# Patient Record
Sex: MALE | Race: WHITE | NOT HISPANIC OR LATINO | Employment: OTHER | URBAN - METROPOLITAN AREA
[De-identification: names, ages, dates, MRNs, and addresses within clinical notes are randomized per-mention and may not be internally consistent; named-entity substitution may affect disease eponyms.]

---

## 2019-02-04 ENCOUNTER — APPOINTMENT (OUTPATIENT)
Dept: CT IMAGING | Facility: CLINIC | Age: 69
End: 2019-02-04
Payer: MEDICARE

## 2019-02-04 ENCOUNTER — HOSPITAL ENCOUNTER (OUTPATIENT)
Facility: CLINIC | Age: 69
Setting detail: OBSERVATION
Discharge: HOME | End: 2019-02-04
Attending: INTERNAL MEDICINE | Admitting: INTERNAL MEDICINE
Payer: MEDICARE

## 2019-02-04 ENCOUNTER — APPOINTMENT (OUTPATIENT)
Dept: CT IMAGING | Facility: CLINIC | Age: 69
End: 2019-02-04
Attending: INTERNAL MEDICINE
Payer: MEDICARE

## 2019-02-04 ENCOUNTER — HOSPITAL ENCOUNTER (EMERGENCY)
Facility: CLINIC | Age: 69
Discharge: OTHER INSTITUTION | End: 2019-02-04
Attending: EMERGENCY MEDICINE | Admitting: EMERGENCY MEDICINE
Payer: MEDICARE

## 2019-02-04 VITALS
RESPIRATION RATE: 11 BRPM | DIASTOLIC BLOOD PRESSURE: 104 MMHG | TEMPERATURE: 99.5 F | SYSTOLIC BLOOD PRESSURE: 167 MMHG | OXYGEN SATURATION: 95 % | HEART RATE: 84 BPM

## 2019-02-04 VITALS
SYSTOLIC BLOOD PRESSURE: 148 MMHG | OXYGEN SATURATION: 97 % | DIASTOLIC BLOOD PRESSURE: 86 MMHG | TEMPERATURE: 98.2 F | RESPIRATION RATE: 16 BRPM

## 2019-02-04 DIAGNOSIS — S06.5XAA SUBDURAL HEMATOMA (H): ICD-10-CM

## 2019-02-04 DIAGNOSIS — S06.5XAA SUBDURAL HEMATOMA (H): Primary | ICD-10-CM

## 2019-02-04 LAB
ANION GAP SERPL CALCULATED.3IONS-SCNC: 8 MMOL/L (ref 3–14)
BASOPHILS # BLD AUTO: 0.1 10E9/L (ref 0–0.2)
BASOPHILS NFR BLD AUTO: 0.6 %
BUN SERPL-MCNC: 37 MG/DL (ref 7–30)
CALCIUM SERPL-MCNC: 7.9 MG/DL (ref 8.5–10.1)
CHLORIDE SERPL-SCNC: 109 MMOL/L (ref 94–109)
CO2 SERPL-SCNC: 23 MMOL/L (ref 20–32)
CREAT SERPL-MCNC: 2.39 MG/DL (ref 0.66–1.25)
DIFFERENTIAL METHOD BLD: ABNORMAL
EOSINOPHIL # BLD AUTO: 0.3 10E9/L (ref 0–0.7)
EOSINOPHIL NFR BLD AUTO: 3.1 %
ERYTHROCYTE [DISTWIDTH] IN BLOOD BY AUTOMATED COUNT: 12.5 % (ref 10–15)
GFR SERPL CREATININE-BSD FRML MDRD: 27 ML/MIN/{1.73_M2}
GLUCOSE SERPL-MCNC: 88 MG/DL (ref 70–99)
HCT VFR BLD AUTO: 41 % (ref 40–53)
HGB BLD-MCNC: 13.2 G/DL (ref 13.3–17.7)
IMM GRANULOCYTES # BLD: 0.1 10E9/L (ref 0–0.4)
IMM GRANULOCYTES NFR BLD: 0.6 %
INR PPP: 1.18 (ref 0.86–1.14)
LYMPHOCYTES # BLD AUTO: 1.6 10E9/L (ref 0.8–5.3)
LYMPHOCYTES NFR BLD AUTO: 18.1 %
MCH RBC QN AUTO: 30.8 PG (ref 26.5–33)
MCHC RBC AUTO-ENTMCNC: 32.2 G/DL (ref 31.5–36.5)
MCV RBC AUTO: 96 FL (ref 78–100)
MONOCYTES # BLD AUTO: 0.8 10E9/L (ref 0–1.3)
MONOCYTES NFR BLD AUTO: 8.6 %
NEUTROPHILS # BLD AUTO: 6 10E9/L (ref 1.6–8.3)
NEUTROPHILS NFR BLD AUTO: 69 %
NRBC # BLD AUTO: 0 10*3/UL
NRBC BLD AUTO-RTO: 0 /100
PLATELET # BLD AUTO: 249 10E9/L (ref 150–450)
POTASSIUM SERPL-SCNC: 4.4 MMOL/L (ref 3.4–5.3)
RBC # BLD AUTO: 4.28 10E12/L (ref 4.4–5.9)
SODIUM SERPL-SCNC: 140 MMOL/L (ref 133–144)
WBC # BLD AUTO: 8.7 10E9/L (ref 4–11)

## 2019-02-04 PROCEDURE — 70450 CT HEAD/BRAIN W/O DYE: CPT

## 2019-02-04 PROCEDURE — G0378 HOSPITAL OBSERVATION PER HR: HCPCS

## 2019-02-04 PROCEDURE — 85610 PROTHROMBIN TIME: CPT | Performed by: EMERGENCY MEDICINE

## 2019-02-04 PROCEDURE — 25000132 ZZH RX MED GY IP 250 OP 250 PS 637: Mod: GY | Performed by: INTERNAL MEDICINE

## 2019-02-04 PROCEDURE — 99207 ZZC CDG-HISTORY COMP: MEETS EXP. PROBLEM FOCUSED-DOWN CODED LACK OF ROS: CPT | Performed by: INTERNAL MEDICINE

## 2019-02-04 PROCEDURE — 99285 EMERGENCY DEPT VISIT HI MDM: CPT | Mod: 25

## 2019-02-04 PROCEDURE — 25000128 H RX IP 250 OP 636

## 2019-02-04 PROCEDURE — 25000128 H RX IP 250 OP 636: Performed by: INTERNAL MEDICINE

## 2019-02-04 PROCEDURE — 80048 BASIC METABOLIC PNL TOTAL CA: CPT | Performed by: EMERGENCY MEDICINE

## 2019-02-04 PROCEDURE — 99221 1ST HOSP IP/OBS SF/LOW 40: CPT | Performed by: PHYSICIAN ASSISTANT

## 2019-02-04 PROCEDURE — 96374 THER/PROPH/DIAG INJ IV PUSH: CPT

## 2019-02-04 PROCEDURE — 99218 ZZC INITIAL OBSERVATION CARE,LEVL I: CPT | Performed by: INTERNAL MEDICINE

## 2019-02-04 PROCEDURE — 99207 ZZC CDG-CODE CATEGORY CHANGED: CPT | Performed by: INTERNAL MEDICINE

## 2019-02-04 PROCEDURE — 70450 CT HEAD/BRAIN W/O DYE: CPT | Mod: 77

## 2019-02-04 PROCEDURE — A9270 NON-COVERED ITEM OR SERVICE: HCPCS | Mod: GY | Performed by: INTERNAL MEDICINE

## 2019-02-04 PROCEDURE — 96376 TX/PRO/DX INJ SAME DRUG ADON: CPT

## 2019-02-04 PROCEDURE — 85025 COMPLETE CBC W/AUTO DIFF WBC: CPT | Performed by: EMERGENCY MEDICINE

## 2019-02-04 PROCEDURE — 25000128 H RX IP 250 OP 636: Performed by: EMERGENCY MEDICINE

## 2019-02-04 RX ORDER — ATORVASTATIN CALCIUM 40 MG/1
40 TABLET, FILM COATED ORAL DAILY
COMMUNITY

## 2019-02-04 RX ORDER — AMOXICILLIN 250 MG
2 CAPSULE ORAL 2 TIMES DAILY
Status: DISCONTINUED | OUTPATIENT
Start: 2019-02-04 | End: 2019-02-04 | Stop reason: HOSPADM

## 2019-02-04 RX ORDER — POLYETHYLENE GLYCOL 3350 17 G/17G
17 POWDER, FOR SOLUTION ORAL DAILY PRN
Status: DISCONTINUED | OUTPATIENT
Start: 2019-02-04 | End: 2019-02-04 | Stop reason: HOSPADM

## 2019-02-04 RX ORDER — METOPROLOL TARTRATE 50 MG
50 TABLET ORAL DAILY
COMMUNITY

## 2019-02-04 RX ORDER — ATORVASTATIN CALCIUM 40 MG/1
40 TABLET, FILM COATED ORAL EVERY EVENING
Status: DISCONTINUED | OUTPATIENT
Start: 2019-02-04 | End: 2019-02-04 | Stop reason: HOSPADM

## 2019-02-04 RX ORDER — DOXAZOSIN 2 MG/1
2 TABLET ORAL AT BEDTIME
Status: DISCONTINUED | OUTPATIENT
Start: 2019-02-04 | End: 2019-02-04 | Stop reason: HOSPADM

## 2019-02-04 RX ORDER — AMOXICILLIN 250 MG
1 CAPSULE ORAL 2 TIMES DAILY PRN
Status: DISCONTINUED | OUTPATIENT
Start: 2019-02-04 | End: 2019-02-04 | Stop reason: HOSPADM

## 2019-02-04 RX ORDER — DOXAZOSIN 2 MG/1
2 TABLET ORAL AT BEDTIME
COMMUNITY

## 2019-02-04 RX ORDER — LABETALOL HYDROCHLORIDE 5 MG/ML
INJECTION, SOLUTION INTRAVENOUS
Status: COMPLETED
Start: 2019-02-04 | End: 2019-02-04

## 2019-02-04 RX ORDER — ONDANSETRON 2 MG/ML
4 INJECTION INTRAMUSCULAR; INTRAVENOUS EVERY 6 HOURS PRN
Status: DISCONTINUED | OUTPATIENT
Start: 2019-02-04 | End: 2019-02-04 | Stop reason: HOSPADM

## 2019-02-04 RX ORDER — PANTOPRAZOLE SODIUM 40 MG/1
40 TABLET, DELAYED RELEASE ORAL
Status: DISCONTINUED | OUTPATIENT
Start: 2019-02-04 | End: 2019-02-04 | Stop reason: HOSPADM

## 2019-02-04 RX ORDER — LIDOCAINE 40 MG/G
CREAM TOPICAL
Status: DISCONTINUED | OUTPATIENT
Start: 2019-02-04 | End: 2019-02-04 | Stop reason: HOSPADM

## 2019-02-04 RX ORDER — AMOXICILLIN 250 MG
2 CAPSULE ORAL 2 TIMES DAILY PRN
Status: DISCONTINUED | OUTPATIENT
Start: 2019-02-04 | End: 2019-02-04 | Stop reason: HOSPADM

## 2019-02-04 RX ORDER — ACETAMINOPHEN 500 MG
500-1000 TABLET ORAL EVERY 6 HOURS PRN
Status: DISCONTINUED | OUTPATIENT
Start: 2019-02-04 | End: 2019-02-04 | Stop reason: HOSPADM

## 2019-02-04 RX ORDER — SODIUM CHLORIDE 9 MG/ML
INJECTION, SOLUTION INTRAVENOUS CONTINUOUS
Status: DISCONTINUED | OUTPATIENT
Start: 2019-02-04 | End: 2019-02-04 | Stop reason: HOSPADM

## 2019-02-04 RX ORDER — LABETALOL HYDROCHLORIDE 5 MG/ML
20 INJECTION, SOLUTION INTRAVENOUS ONCE
Status: COMPLETED | OUTPATIENT
Start: 2019-02-04 | End: 2019-02-04

## 2019-02-04 RX ORDER — BISACODYL 10 MG
10 SUPPOSITORY, RECTAL RECTAL DAILY PRN
Status: DISCONTINUED | OUTPATIENT
Start: 2019-02-04 | End: 2019-02-04 | Stop reason: HOSPADM

## 2019-02-04 RX ORDER — METOPROLOL TARTRATE 50 MG
50 TABLET ORAL EVERY EVENING
Status: DISCONTINUED | OUTPATIENT
Start: 2019-02-04 | End: 2019-02-04 | Stop reason: HOSPADM

## 2019-02-04 RX ORDER — LABETALOL HYDROCHLORIDE 5 MG/ML
10 INJECTION, SOLUTION INTRAVENOUS
Status: DISCONTINUED | OUTPATIENT
Start: 2019-02-04 | End: 2019-02-04 | Stop reason: HOSPADM

## 2019-02-04 RX ORDER — ONDANSETRON 4 MG/1
4 TABLET, ORALLY DISINTEGRATING ORAL EVERY 6 HOURS PRN
Status: DISCONTINUED | OUTPATIENT
Start: 2019-02-04 | End: 2019-02-04 | Stop reason: HOSPADM

## 2019-02-04 RX ORDER — ACETAMINOPHEN 500 MG
500-1000 TABLET ORAL EVERY 6 HOURS PRN
COMMUNITY

## 2019-02-04 RX ORDER — ACETAMINOPHEN 325 MG/1
650 TABLET ORAL EVERY 4 HOURS PRN
Status: DISCONTINUED | OUTPATIENT
Start: 2019-02-04 | End: 2019-02-04

## 2019-02-04 RX ORDER — NALOXONE HYDROCHLORIDE 0.4 MG/ML
.1-.4 INJECTION, SOLUTION INTRAMUSCULAR; INTRAVENOUS; SUBCUTANEOUS
Status: DISCONTINUED | OUTPATIENT
Start: 2019-02-04 | End: 2019-02-04 | Stop reason: HOSPADM

## 2019-02-04 RX ORDER — ASPIRIN 81 MG/1
81 TABLET ORAL DAILY
Status: ON HOLD | COMMUNITY
End: 2019-02-04

## 2019-02-04 RX ORDER — AMOXICILLIN 250 MG
1 CAPSULE ORAL 2 TIMES DAILY
Status: DISCONTINUED | OUTPATIENT
Start: 2019-02-04 | End: 2019-02-04 | Stop reason: HOSPADM

## 2019-02-04 RX ADMIN — LABETALOL HYDROCHLORIDE 20 MG: 5 INJECTION INTRAVENOUS at 03:01

## 2019-02-04 RX ADMIN — ACETAMINOPHEN 650 MG: 325 TABLET, FILM COATED ORAL at 09:31

## 2019-02-04 RX ADMIN — PANTOPRAZOLE SODIUM 40 MG: 40 TABLET, DELAYED RELEASE ORAL at 08:04

## 2019-02-04 RX ADMIN — LABETALOL HYDROCHLORIDE 20 MG: 5 INJECTION INTRAVENOUS at 04:12

## 2019-02-04 RX ADMIN — SODIUM CHLORIDE, PRESERVATIVE FREE: 5 INJECTION INTRAVENOUS at 08:04

## 2019-02-04 RX ADMIN — LABETALOL HYDROCHLORIDE 20 MG: 5 INJECTION, SOLUTION INTRAVENOUS at 04:12

## 2019-02-04 SDOH — HEALTH STABILITY: MENTAL HEALTH: HOW OFTEN DO YOU HAVE A DRINK CONTAINING ALCOHOL?: NEVER

## 2019-02-04 ASSESSMENT — ACTIVITIES OF DAILY LIVING (ADL)
ADLS_ACUITY_SCORE: 11
ADLS_ACUITY_SCORE: 11

## 2019-02-04 ASSESSMENT — ENCOUNTER SYMPTOMS
NECK PAIN: 0
WOUND: 1
HEADACHES: 1
BACK PAIN: 0
VOMITING: 0
DIZZINESS: 1
NAUSEA: 0
NUMBNESS: 0
WEAKNESS: 0

## 2019-02-04 NOTE — DISCHARGE SUMMARY
[facility]  Discharge Summary        Bossman Wayne MRN# [id number]   YOB: 1950 Age: 68 year old     Date of Admission:  2/4/2019  Date of Discharge:  2/4/2019  Admitting Physician:  Edel Orellana MD  Discharge Physician: Edel Orellana MD  Discharging Service: Hospitalist     Primary Provider: [facility]  Primary Care Physician Phone Number: None         Discharge Diagnoses/Problem Oriented Hospital Course (Providers):    Bossman Wayne was admitted on 2/4/2019 by Edel Orellana MD and I would refer you to their history and physical.  The following problems were addressed during his hospitalization:  Bossman Wayne is a 68-year-old gentleman with hypertension, hyperlipidemia, chronic kidney disease, and benign prostatic hypertrophy, who sustained a mechanical fall while slipping on the ice, hit in the back of his head sustaining a very small 3 mm acute subdural hematoma along the right side of the tentorium without any midline shift or focal areas of other hemorrhage or acute ischemia, being admitted for further treatment.      PLAN:   1.  Fall with small acute subdural hematoma along the right side of the tentorium measured 3 mm:  He remained neurologically intact with no focal deficits  Neurosurgery consulted   Repeat head CT done and showed stable bleed  Baby aspirin stopped   Ok to discharge today per Neurosurgery   2. HTN:  Continue toprol Xl 50mg po daily   3.  Hyperlipidemia.  We will continue the patient on Zocor.   4.  Stage III chronic kidney disease.  The patient will receive IV fluids.  The patient's baseline creatinine is around 2.5, which he is currently at.      5.  Deep venous thrombosis prophylaxis:  The patient will receive compression boots.      CODE STATUS:  Full.     Dispo: home today with wife           Code Status:      Full Code        Brief Hospital Stay Summary Sent Home With Patient in AVS:               Important Results:      See below          Pending Results:        Unresulted Labs Ordered in the Past 30 Days of this Admission     No orders found from 12/6/2018 to 2/5/2019.            Discharge Instructions and Follow-Up:      Follow-up Appointments     Follow-up and recommended labs and tests       [facility]6 weeks with head CT   prior.  Please call the clinic at [phone] to schedule your   appointment with Ancelmo Christianson PA-C or Yue Plascencia PA-C.               Discharge Disposition:      Discharged to home        Discharge Medications:        Current Discharge Medication List      CONTINUE these medications which have NOT CHANGED    Details   acetaminophen (TYLENOL) 500 MG tablet Take 500-1,000 mg by mouth every 6 hours as needed for mild pain      atorvastatin (LIPITOR) 40 MG tablet Take 40 mg by mouth daily      doxazosin (CARDURA) 2 MG tablet Take 2 mg by mouth At Bedtime      metoprolol tartrate (LOPRESSOR) 50 MG tablet Take 50 mg by mouth daily         STOP taking these medications       aspirin 81 MG EC tablet Comments:   Reason for Stopping:                 Allergies:       No Known Allergies        Consultations This Hospital Stay:      Consultation during this admission received from neurosurgery         Condition and Physical on Discharge:      Discharge condition: Stable   Vitals: Blood pressure 148/86, temperature 98.2  F (36.8  C), temperature source Oral, resp. rate 16, SpO2 97 %.     Constitutional: alert and awake    Lungs: CTA   Cardiovascular: RRR   Abdomen: Soft, NT, ND, BS+   Skin: Warm and dry    NEuro       No focal weakness       Discharge Time:      Greater than 30 minutes.        Image Results From This Hospital Stay (For Non-EPIC Providers):        Results for orders placed or performed during the hospital encounter of 02/04/19   CT Head w/o Contrast    Narrative    CT OF THE HEAD WITHOUT CONTRAST 2/4/2019 12:27 PM     COMPARISON: Head CT 2/4/2019 0127 hours    HISTORY:  Intracranial  hemorrhage, known, follow up    TECHNIQUE: Axial CT images of the head from the skull base to the  vertex were acquired without IV contrast.    FINDINGS: Thin subdural hemorrhage along the right leaf of the  tentorium and posterior falx again noted without change. There is no  evidence for new or increasing intracranial hemorrhage.     The ventricles and basal cisterns are within normal limits in  configuration. There is no midline shift. There are no extra-axial  fluid collections.  Gray-white differentiation is well maintained.    No intracranial mass or recent infarct.    The visualized paranasal sinuses are well-aerated. There is no  mastoiditis. There are no fractures of the visualized bones.      Impression    IMPRESSION:  1. Stable thin subdural hemorrhage along the posterior falx and right  leaf of the tentorium. No evidence for new or increasing hemorrhage.  2. Otherwise, normal head CT.      Radiation dose for this scan was reduced using automated exposure  control, adjustment of the mA and/or kV according to patient size, or  iterative reconstruction technique    BERNARD ERIC MD             Most Recent Lab Results In EPIC (For Non-EPIC Providers):    Most Recent 3 CBC's:  Recent Labs   Lab Test 02/04/19 0253   WBC 8.7   HGB 13.2*   MCV 96         Most Recent 3 BMP's:  Recent Labs   Lab Test 02/04/19 0253      POTASSIUM 4.4   CHLORIDE 109   CO2 23   BUN 37*   CR 2.39*   ANIONGAP 8   MOHAMUD 7.9*   GLC 88     Most Recent 3 Troponin's:No lab results found.    Invalid input(s): TROP, TROPONINIES  Most Recent 3 INR's:  Recent Labs   Lab Test 02/04/19 0253   INR 1.18*     Most Recent 2 LFT's:No lab results found.  Most Recent Cholesterol Panel:No lab results found.  Most Recent 6 Bacteria Isolates From Any Culture (See EPIC Reports for Culture Details):No lab results found.  Most Recent TSH, T4 and HgbA1c: No lab results found.

## 2019-02-04 NOTE — ED PROVIDER NOTES
History     Chief Complaint:  Head Injury    HPI   Bossman Wayne is a 68 year old male with a history of hypertension and chronic kidney disease who presents for evaluation following a head injury. Tonight shortly prior to arrival, the patient was outside checking his mail when he slipped on the ice and fell backwards striking his head against the ground. He did sustain an abrasion to the back of his head but he did not lose consciousness in the fall. Since the fall, the patient has developed a headache and some dizziness. Due to this head injury, the patient came into the ED for evaluation. He takes 81 mg of Aspirin daily but is not otherwise anticoagulated. Otherwise, he has not had any nausea, vomiting, neck pain, back pain, numbness, weakness, or visual disturbance since the fall, and he denies any other injuries from the fall. His tetanus status was last updated in 2013.     Allergies:  NKDA      Medications:    Metoprolol    Lisinopril   Zocor   Aspirin 81 mg     Past Medical History:    Chronic kidney disease  Hypertension   Hyperlipidemia   BPH  CKD, stage 3   Mass or right adrenal gland     Past Surgical History:    Appendectomy   Robotic assisted right adrenalectomy     Family History:    History reviewed. No pertinent family history.     Social History:  Tobacco use:    Never smoker   Alcohol use:    Negative   Marital status:       Accompanied to ED by:  Wife    Immunization status:   Tetanus last updated in 2013     Review of Systems   Eyes: Negative for visual disturbance.   Gastrointestinal: Negative for nausea and vomiting.   Musculoskeletal: Negative for back pain and neck pain.   Skin: Positive for wound (abrasion, back of head).   Neurological: Positive for dizziness and headaches. Negative for syncope, weakness and numbness.   All other systems reviewed and are negative.      Physical Exam   First Vitals:  BP: (!) 177/110  Pulse: 85  Heart Rate: 77  Temp: 99.5  F (37.5  C)  Resp:  16  SpO2: 97 %      Physical Exam  Gen- alert, cooperative, sitting in bed  HEENT- occipital scalp abrasion and contusion with some swelling, PERRL, EOMI, conjunctivae normal bilaterally, TM clear bilaterally, no nasal trauma, MMM, no dental injury, no oral injury  Neck- soft, no TTP midline c spine, ROM normal  Lungs- CTAB, no w/r/r, no chest wall trauma  CV- RRR, no m/r/g, intact distal pulses x 4  Abd- soft, NT/ND, +BS, no signs of abdominal trauma, no CVAT  Msk- sensation normal in all extremities, 5/5 strength x 4, no T and L spine TTP in midline  Neuro- a & o x 3, speech normal, no drift, gait normal, DTR 2+ in all extremities  Skin- no cyanosis, no rash, no edema, no abrasions    Emergency Department Course     Imaging:  Radiographic findings were communicated with the patient and family who voiced understanding of the findings.    CT Head w/o Contrast:  Conclusion:   1 Probably small acute subdural hematoma along right side of the tentorium of 3 mm.   2. No other hemorrhage, midline shift or focal area suggestive of acute ischemia.   Preliminary report per radiology.     Laboratory:  CBC: HGB 13.2 low, o/w WNL (WBC 8.7, )   BMP: BUN 37 high, Calcium 7.9 low Creatinine 2.39 high, GFR Estimate 27 low, o/w WNL   INR: 1.18 high     Interventions:  0301 Labetalol 20 mg IV     Emergency Department Course:  Nursing notes and vitals reviewed.  0111: I performed an exam of the patient as documented above.     0208: I updated and reassessed the patient.     0222: I spoke with Afua Ag, for the neurosurgery service regarding patient's presentation, findings, and plan of care.     0232: I spoke with Dr. Ordaz of the hospitalist service from [facility] regarding patient's presentation, findings, and plan of care.     Findings and plan explained to the Patient. Patient will be transferred to [facility] via EMS. Discussed the case with Dr. Ordaz, who will admit the patient to a monitored bed  for further monitoring, evaluation, and treatment.    Impression & Plan      Medical Decision Making:  Bossman Wayne is a 68 year old male who presents for a head injury after falling on the [employer] driveway at home. The patient is neurologically intact. It looks like he has just an abrasion on the occipital scalp and therefore no laceration repair is needed. The patient's CT did show a small subdural hematoma. He remained neurologically intact. He was mildly hypertensive and was treated with labetalol. I talked to Ancelmo, the PA for neurosurgery, and he advised transfer to [facility] for further monitoring. This is non-surgical at this point and he will need a repeat CT in the morning. He was admitted to Dr. Ordaz of the hospitalist service at [facility]. The patient was transferred in stable condition.     Diagnosis:    ICD-10-CM   1. Subdural hematoma (H) S06.5X9A   2. HTN    Disposition:  Transfer to [facility].       IChano, am serving as a scribe at 1:11 AM on 2/4/2019 to document services personally performed by Dr. Ware, based on my observations and the provider's statements to me.     [facility] EMERGENCY DEPARTMENT       Yanira Ware MD  02/04/19 0431

## 2019-02-04 NOTE — ED TRIAGE NOTES
Slipped on ice and hit back of head. Denies loss of con. [place] to head.    Pt A&O x 3, CMS x 3, ABCD's adequate in triage

## 2019-02-04 NOTE — PROGRESS NOTES
SPIRITUAL HEALTH SERVICES  Spiritual Assessment Progress Note  [facility]   talked with pt about his fall and head trauma.  He named that his headache was getting better.      Pt has had the support of his wife and his  will be coming this afternoon.  Pt attends and [employer].    Pt and wife were supposed to fly to [place] today to spend some time visiting his daughter in [facility].  They are likely going to reschedule.       provided support and prayer.    No plans to follow.                                                                                                                                            Carli Womack M.Div., [facility]  Staff    Pager [phone]

## 2019-02-04 NOTE — PLAN OF CARE
Patient is here with SDH/ICH from fall on ice at home. A&O x4. Neuros intact. VSS. Tolerating regular diet fairly. Up independently with wife at side. Ice and Tylenol given x1 for head and neck pain. Patient discharged home at 1530.

## 2019-02-04 NOTE — PHARMACY-ADMISSION MEDICATION HISTORY
Admission medication history interview status for the 2/4/2019  admission is complete. See EPIC admission navigator for prior to admission medications     Medication history source reliability:Good    Actions taken by pharmacist (provider contacted, etc): Verified medication doses with patient's pharmacy ([facility] in [place]).     Additional medication history information not noted on PTA med list :None    Medication reconciliation/reorder completed by provider prior to medication history? No    Time spent in this activity: 5 minutes    Prior to Admission medications    Medication Sig Last Dose Taking? Auth Provider   acetaminophen (TYLENOL) 500 MG tablet Take 500-1,000 mg by mouth every 6 hours as needed for mild pain PRN Yes Unknown, Entered By History   aspirin 81 MG EC tablet Take 81 mg by mouth daily 2/2/2019 at 2000 Yes Unknown, Entered By History   atorvastatin (LIPITOR) 40 MG tablet Take 40 mg by mouth daily 2/2/2019 at 2000 Yes Unknown, Entered By History   doxazosin (CARDURA) 2 MG tablet Take 2 mg by mouth At Bedtime 2/2/2019 at 2000 Yes Unknown, Entered By History   metoprolol tartrate (LOPRESSOR) 50 MG tablet Take 50 mg by mouth daily 2/2/2019 at 2000 Yes Unknown, Entered By History

## 2019-02-04 NOTE — PROGRESS NOTES
2/4/19  0250    Received report from Bossman at [facility]. Pt will receive Labetelol there. Pt will transport to [facility] via transport.

## 2019-02-04 NOTE — H&P
Admitted:     02/04/2019      PRIMARY CARE PHYSICIAN:  Radha Pompa MD, [facility]      CHIEF COMPLAINT:  Fall with head injury.      HISTORY OF PRESENT ILLNESS:  Bossman Wayne is a very pleasant 68-year-old  gentleman with history of stage III chronic kidney disease, hypertension, hyperlipidemia plus other diagnoses, who presents to [facility] after initial admission to [facility] after he fell and sustained a head injury.      The patient and his wife were planning on going to [place] today.  He was at his mail box yesterday evening getting  mail, and while doing so.  He slipped on the ice and fell backwards striking the back of his head.  He sustained  abrasion injury to the back of the head and likely a subgaleal hematoma.  The patient is pretty sure that he did lose any consciousness, but the patient felt dizzy and mildly nauseated.  The patient normally is on low-dose aspirin.  The patient was transferred to [facility] where he was seen by Dr. Yanira Santana.  The patient was afebrile, vital signs were stable, slightly hypertensive.  Blood work revealed normal electrolytes, BUN of 37, creatinine is 2.39 with calculated GFR of 27.  Glucose was 88.  CBC shows white count 8.7, hemoglobin 13.2, platelets of 249,000.  INR is 1.18.  A head CT without contrast showed a probable small acute subdural hematoma along the right side of the tentorium of about 3 mm.  There is no other hemorrhage, midline shift or acute ischemia noted.  The patient was given 20 mg IV labetalol for blood pressures of 177/110, and due to lack of Neurosurgical Services at this Hospital.  The patient was transferred to [facility] for further evaluation.      The patient was interviewed by me on the floor.  He is oriented x3.  He is just sore with the back of his head, shoulders and neck.  He is neurologically intact, however.  His wife accompanies him.       PAST MEDICAL HISTORY:   1.  Chronic kidney disease, stage III.     2.  Hypertension.     3.  Hyperlipidemia.     4.  BPH.   5.  History of right adrenal mass.      PAST SURGICAL HISTORY:  Appendectomy and robotic-assisted right adrenalectomy.      FAMILY HISTORY:  Reviewed and otherwise negative.      ALLERGIES:  NO KNOWN DRUG ALLERGIES, SHELLFISH CONTAINING PRODUCTS CAUSE NAUSEA AND VOMITING.      CURRENT MEDICATIONS:   1.  Lisinopril 5 mg once daily.   2.  Percocet 1-2 tabs every 4 hours as needed.   3.  Metoprolol 50 mg twice a day.   4.  Olopatadine 0.1% ophthalmic solution 1-2 drops both eyes daily.   5.  Zocor 40 mg daily.      REVIEW OF SYSTEMS:  Ten-point systems reviewed and as dictated in history of present illness.  The patient had mild nausea.  Positive for neck pain, headache, denies any chest pain, denies any black or bloody stools.  Denies any fevers, chills, sweats.  Had some visual changes.  Denies any focal weakness currently.  Otherwise, 10-point review of systems were reviewed and otherwise unremarkable.      PHYSICAL EXAMINATION:   VITAL SIGNS:  Temperature 97.7, heart rate 84, respiration 18, blood pressure 146/86, saturations 97% on room air.   GENERAL:  The patient is a 68-year-old  gentleman, medium built, who is oriented x3.   HEENT:  He has a goose egg in the occipital region with some abrasions.  Pupils are equal, round and reactive to light.  Extraocular movements intact.  Head is otherwise atraumatic.  Tongue is midline.  Oropharynx:  Mucous membranes are moist.   NECK:  Veins are not distended.  The patient has reasonable range of motion of the neck.   PULMONARY:  Lungs are clear to auscultation.   CARDIOVASCULAR:  S1, S2, regular rate and rhythm.   ABDOMEN:  Soft, nontender, normoactive bowel sounds.   MUSCULOSKELETAL:  No edema.   NEUROLOGIC:  Negative pronator drift.  Good  strength.  Preserved motor, sensory and coordination.  No visual deficits.      LABORATORY AND  STUDIES:  As dictated in the history of present illness.        ASSESSMENT:  Anna Wayne is a 68-year-old gentleman with hypertension, hyperlipidemia, chronic kidney disease, and benign prostatic hypertrophy, who sustained a mechanical fall while slipping on the ice, hit in the back of his head sustaining a very small 3 mm acute subdural hematoma along the right side of the tentorium without any midline shift or focal areas of other hemorrhage or acute ischemia, being admitted for further treatment.      PLAN:   1.  Fall with small acute subdural hematoma along the right side of the tentorium measured 3 mm.  The patient will be admitted to the neuro floor.  The patient will get neuro checks more frequently to begin with.  The patient will be on normal saline.  GI prophylaxis.  The patient will have a repeat head CT at around noon.  The patient will have formal neurosurgical consultation.  The patient's blood pressure will be monitored with IV labetalol to keep systolic blood pressure less than 140.   2.  Hypertension.  The patient will be continued on his metoprolol and lisinopril.  Once these medications are verified, as well as the addition of IV labetalol to keep systolic blood pressure less than 140.   3.  Hyperlipidemia.  We will continue the patient on Zocor.   4.  Stage III chronic kidney disease.  The patient will receive IV fluids.  The patient's baseline creatinine is around 2.5, which he is currently at.      5.  Deep venous thrombosis prophylaxis:  The patient will receive compression boots.      CODE STATUS:  Full.         ANSELMO PATTERSON MD             D: 2019   T: 2019   MT: ELSY      Name:     ANNA WAYNE   MRN:      [id number]        Account:      [id number]   :      1950        Admitted:     2019                   Document: [id number]       cc: NGA Pompa MD

## 2019-02-04 NOTE — UTILIZATION REVIEW
"[employer]  Admission Status; Secondary Review Determination     Admission Date: 2/4/2019  5:12 AM       Under the authority of the Utilization Management Committee, the utilization review process indicated a secondary review on the above patient.  The review outcome is based on review of the medical records, discussions with staff, and applying clinical experience noted on the date of the review.        ()      Inpatient Status Appropriate - This patient's medical care is consistent with medical management for inpatient care and reasonable inpatient medical practice.      (X) Observation Status Appropriate - This patient does not meet hospital inpatient criteria and is placed in observation status. If this patient's primary payer is Medicare and was admitted as an inpatient, Condition Code 44 should be used and patient status changed to \"observation\".   () Admission Status NOT Appropriate - This patient's medical care is not consistent with medical management for Inpatient or Observation Status.        () Outpatient Procedure Status Appropriate - Procedure not on Medicare Inpatient list and no complications at the time of this review       RATIONALE FOR DETERMINATION      Brief clinical presentation, information copied from the chart, abbreviated and edited for relevant content:     2/4/2019 3:29 PM (CT) Mercedez Dejesus: Team changed from IP to OBS appropriately.    Bossman Wayne is a 68-year-old man with hypertension, hyperlipidemia, chronic kidney disease, and benign prostatic hypertrophy, who sustained a mechanical fall while slipping on the ice, hit in the back of his head sustaining a very small 3 mm acute subdural hematoma along the right side of the tentorium without any midline shift or focal areas of other hemorrhage or acute ischemia, admitted for further treatment.   Discharge findings:  1.  Fall with small acute subdural hematoma along the right side of the tentorium measured 3 mm:  He remained " neurologically intact  during his admission with no focal deficits  Neurosurgery consulted   Repeat head CT done and showed stable bleed  Baby aspirin stopped   Ok to discharge today per Neurosurgery               The information on this document is developed by the utilization review team in order for the business office to ensure compliance.  This only denotes the appropriateness of proper admission status and does not reflect the quality of care rendered.         The definitions of Inpatient Status and Observation Status used in making the determination above are those provided in the CMS Coverage Manual, Chapter 1 and Chapter 6, section 70.4.      Sincerely,      Mercedez Dejesus MD   Utilization Review/ Case Management  [facility].

## 2019-02-04 NOTE — PLAN OF CARE
Admitted to station 73 from [facility] at approx 0500. Received an additional 60 mg of labetelol from EMS. BP unpon arrival was 148/93, other VSS on room air. Reports headache/neck ache. Cold applied.     Denies vision changes, neuros intact. Small amount of bright red blood from back of head. Alert, oriented, pleasant. Appears steady on feet, calls appropriately.    Wife, Karo, at bedside. Anticipate re-imaging today. Discharge pending.

## 2019-03-22 ENCOUNTER — OFFICE VISIT (OUTPATIENT)
Dept: NEUROSURGERY | Facility: CLINIC | Age: 69
End: 2019-03-22
Attending: PHYSICIAN ASSISTANT
Payer: MEDICARE

## 2019-03-22 ENCOUNTER — HOSPITAL ENCOUNTER (OUTPATIENT)
Dept: CT IMAGING | Facility: CLINIC | Age: 69
Discharge: HOME | End: 2019-03-22
Attending: PHYSICIAN ASSISTANT | Admitting: PHYSICIAN ASSISTANT
Payer: MEDICARE

## 2019-03-22 VITALS
SYSTOLIC BLOOD PRESSURE: 123 MMHG | WEIGHT: 188.4 LBS | TEMPERATURE: 98.1 F | DIASTOLIC BLOOD PRESSURE: 74 MMHG | HEART RATE: 66 BPM

## 2019-03-22 DIAGNOSIS — S06.5XAA SUBDURAL HEMATOMA (H): Primary | ICD-10-CM

## 2019-03-22 DIAGNOSIS — S06.5XAA SUBDURAL HEMATOMA (H): ICD-10-CM

## 2019-03-22 PROCEDURE — 70450 CT HEAD/BRAIN W/O DYE: CPT

## 2019-03-22 PROCEDURE — G0463 HOSPITAL OUTPT CLINIC VISIT: HCPCS

## 2019-03-22 PROCEDURE — 99213 OFFICE O/P EST LOW 20 MIN: CPT | Performed by: NURSE PRACTITIONER

## 2019-03-22 ASSESSMENT — PAIN SCALES - GENERAL: PAINLEVEL: NO PAIN (0)

## 2019-03-22 NOTE — NURSING NOTE
Bossman Wayne is a 68 year old male who presents for:  Chief Complaint   Patient presents with     Neurologic Problem        Initial Vitals:  /74   Pulse 66   Temp 98.1  F (36.7  C)   Wt 188 lb 6.4 oz (85.5 kg)  There is no height or weight on file to calculate BMI.. There is no height or weight on file to calculate BSA. BP completed using cuff size: regular  No Pain (0)        Nursing Comments: follow up from ED on 2/4/19 for SDH        Debbie Paredes

## 2019-03-22 NOTE — LETTER
3/22/2019         RE: Bossman Wayne  [address] [facility] [address]        Dear Colleague,    Thank you for referring your patient, Bossman Wayne, to the [facility]. Please see a copy of my visit note below.    [facility]  Neurosurgery followup:    HPI:  68 year old male who presents today for ED follow up s/p fall resulting in a thin SDH along the posterior falx and right leaf of tentorium. Today he has no complaints. He denies headache, weakness, dizziness, and visual disturbances.    Exam:  Constitutional:  Alert, well nourished, NAD.  HEENT: Normocephalic, atraumatic.   Pulm:  Without shortness of breath or audible adventitious respiratory sounds.  CV:  No pitting edema of BLE.  Brisk capillary refill, CMS intact.    Neurological:  Awake  Alert  Oriented x 3  Speech clear  Cranial nerves II - XII intact  PERRL  EOMI  Face symmetric  Tongue midline  Motor exam  5/5 strength in all four extremities.   Sensation intact  Finger to Nose smooth  Pronator drift negative    Gait: Able to stand from a seated position. Normal non-antalgic, non-myelopathic gait.  Able to heel/toe walk without loss of balance    Imaging: Head CT 3/22/2019 reveals resolution of previous SDH.     A/P: 68 year old male who presents today for ED follow up s/p fall resulting in a thin SDH along the posterior falx and right leaf of tentorium. Today he has no complaints. He denies headache, weakness, dizziness, and visual disturbances. Discussed head CT results. No scheduled follow up recommended. Follow up as needed. Ok to resume baby aspirin. Patient verbalized understanding and agreement with the plan.    Patient Instructions   -Follow up as needed.  -Please contact the clinic with questions or concerns at [phone].      Merle Reese CNP  [facility]  [address]  [address] [place] [address]    Tel [phone]  Pager  [phone]        Again, thank you for allowing me to participate in the care of your patient.        Sincerely,        Merle Reese NP

## 2019-03-22 NOTE — PROGRESS NOTES
[facility]  Neurosurgery followup:    HPI:  68 year old male who presents today for ED follow up s/p fall resulting in a thin SDH along the posterior falx and right leaf of tentorium. Today he has no complaints. He denies headache, weakness, dizziness, and visual disturbances.    Exam:  Constitutional:  Alert, well nourished, NAD.  HEENT: Normocephalic, atraumatic.   Pulm:  Without shortness of breath or audible adventitious respiratory sounds.  CV:  No pitting edema of BLE.  Brisk capillary refill, CMS intact.    Neurological:  Awake  Alert  Oriented x 3  Speech clear  Cranial nerves II - XII intact  PERRL  EOMI  Face symmetric  Tongue midline  Motor exam  5/5 strength in all four extremities.   Sensation intact  Finger to Nose smooth  Pronator drift negative    Gait: Able to stand from a seated position. Normal non-antalgic, non-myelopathic gait.  Able to heel/toe walk without loss of balance    Imaging: Head CT 3/22/2019 reveals resolution of previous SDH.     A/P: 68 year old male who presents today for ED follow up s/p fall resulting in a thin SDH along the posterior falx and right leaf of tentorium. Today he has no complaints. He denies headache, weakness, dizziness, and visual disturbances. Discussed head CT results. No scheduled follow up recommended. Follow up as needed. Ok to resume baby aspirin. Patient verbalized understanding and agreement with the plan.    Patient Instructions   -Follow up as needed.  -Please contact the clinic with questions or concerns at [phone].      Merle Reese CNP  [facility]  [address] [address]    Tel [phone]  Pager [phone]

## 2019-03-22 NOTE — PATIENT INSTRUCTIONS
-Follow up as needed.  -May resume baby aspirin.  -Please contact the clinic with questions or concerns at [phone].

## 2019-09-27 ENCOUNTER — OFFICE VISIT (OUTPATIENT)
Dept: PODIATRY | Facility: CLINIC | Age: 69
End: 2019-09-27
Payer: MEDICARE

## 2019-09-27 VITALS — WEIGHT: 176 LBS | DIASTOLIC BLOOD PRESSURE: 88 MMHG | SYSTOLIC BLOOD PRESSURE: 130 MMHG

## 2019-09-27 DIAGNOSIS — B35.3 TINEA PEDIS OF BOTH FEET: ICD-10-CM

## 2019-09-27 DIAGNOSIS — B35.1 ONYCHOMYCOSIS: Primary | ICD-10-CM

## 2019-09-27 LAB
ALT SERPL W P-5'-P-CCNC: 25 U/L (ref 0–70)
AST SERPL W P-5'-P-CCNC: 17 U/L (ref 0–45)

## 2019-09-27 PROCEDURE — 84450 TRANSFERASE (AST) (SGOT): CPT | Performed by: PODIATRIST

## 2019-09-27 PROCEDURE — 84460 ALANINE AMINO (ALT) (SGPT): CPT | Performed by: PODIATRIST

## 2019-09-27 PROCEDURE — 36415 COLL VENOUS BLD VENIPUNCTURE: CPT | Performed by: PODIATRIST

## 2019-09-27 PROCEDURE — 99203 OFFICE O/P NEW LOW 30 MIN: CPT | Performed by: PODIATRIST

## 2019-09-27 RX ORDER — FEXOFENADINE HCL 180 MG/1
180 TABLET ORAL DAILY PRN
COMMUNITY

## 2019-09-27 RX ORDER — ASPIRIN 81 MG/1
81 TABLET ORAL DAILY
COMMUNITY

## 2019-09-27 RX ORDER — TERBINAFINE HYDROCHLORIDE 250 MG/1
250 TABLET ORAL DAILY
Qty: 90 TABLET | Refills: 0 | Status: SHIPPED | OUTPATIENT
Start: 2019-09-27 | End: 2019-12-26

## 2019-09-27 NOTE — PATIENT INSTRUCTIONS
Thank you for choosing [facility]!    DR. SAMUEL'S CLINIC LOCATIONS:   MONDAY - [place] TUESDAY - [place]   [address] [facility]  [address]   [place] [address] [place] [address]   [phone]       THURSDAY AM - [place] THURSDAY PM - [place]   [address]   [place] [address] [place], [place] [address] [phone]       FRIDAY AM - [place] SET UP SURGERY: [phone] [address] APPOINTMENTS: [phone]   [place] [address] BILLING QUESTIONS: [phone] FAX NUMBER: [phone]     Follow Up: as needed      NAIL FUNGUS / ONYCHOMYCOSIS   Nail fungus is not a hygiene problem and will not likely lead to significant medical   problems. The nails may get thick causing pain and possibly local skin infection.   Treatments include debridement (trimming), oral antifungals, topical antifungals and complete removal of the nail. Most fungal nails are not treated.   Topicals such as tea tree oil can be helpful for surface fungus and may, at best, limit   progression. Over the counter creams (such as Lamisil) can also be used however, their effectiveness is also quite low.  Topical treatment with Pen lac is expensive and often not covered by insurance. Pen lac has an approximate 8% success rate. Topical therapy recommendations is to apply twice a day for at least 3-4 months as it takes 9 months for new nail to grow out.    Experts suggest soaking your feet for 15 to 20 minutes in a mixture of 1 cup vinegar to 4 cups warm water. Be sure to rinse well and pat your feet dry when you're done. You can soak your feet like this daily. But if your skin becomes irritated, try soaking only two to three times a week. Vicks VapoRub, as with vinegar, there have been no controlled clinical trials to assess the effectiveness of Vicks VapoRub on nail fungus, but there have been numerous anecdotal  reports that it works. There's no consensus on how often to apply this product, so check with your doctor before using it on your nails.     Oral therapies include Sporanox and Lamisil. Oral therapies are also expensive and not very effective. Side effects such as liver disease are the main concern. Return of fungus is common even if the treatment worked.     Other Tips:  - Penlac nail medication apply daily x 4 months; remove old polish first day of each week  - Antifungal cream/powder (Zeasorb) - apply daily to feet and shoes x 2 months  - Clean shoes with Lysol or in washing machine every few weeks  - Rotate shoe gear; give them 24 hours to dry out between days wearing them  - Clean pair of socks in morning, clean pair in afternoon if your feet sweat  - Shower shoes used in public showers/pools        FYI: The following information is included in the after visit summary for all patients:  Body weight can be a sensitive issue to discuss in clinic, but we think the following information is very important. Although we focus on the feet and ankles, we do support the overall health of our patients. Many things can cause foot and ankle problems. Foot structure, activity level, foot mechanics and injuries are common causes of pain. One very important issue that often goes unmentioned, is body weight. Extra weight can cause increased stress on muscles, ligaments, bones and tendons. Sometimes just a few extra pounds is all it takes to put one over her/his threshold. Without reducing that stress, it can be difficult to alleviate pain. As Foot & Ankle specialists, our job is addressing the lower extremity problem and possible causes. Regarding extra body weight, we encourage patients to discuss diet and weight management plans with their primary care doctors. It is this team approach that gives you the best opportunity for pain relief and getting you back on your feet. [facility] has a Comprehensive Weight Management Program.  This program includes counseling, education, non-surgical and surgical approaches to weight loss. If you are interested in learning more either talk to you primary care provider or call [phone].

## 2019-09-27 NOTE — PROGRESS NOTES
"Foot & Ankle Surgery  September 27, 2019    CC: \"toe nail fungus\"    I was asked to see Bossman Wayne regarding the chief complaint by:  self    HPI:  Pt is a 68 year old male who presents with above complaint.  Bilateral lower extremity issue x 3 years. \"get rid of my toenail fungus'.  \"none\" for symptoms, states his wife encouraged him to have this addressed.  Has tried some \"anti-fungal solutions\" without sufficient success.    ROS:   Pos for CC.  The patient denies current nausea, vomiting, chills, fevers, belly pain, calf pain, chest pain or SOB.  Complete remainder of ROS is otherwise neg.    VITALS:    Vitals:    09/27/19 0915   BP: 130/88   Weight: 79.8 kg (176 lb)       PMH:    Past Medical History:   Diagnosis Date     Chronic kidney disease     chronic kidney     Hypertension        SXHX:  No past surgical history on file.     MEDS:    Current Outpatient Medications   Medication     acetaminophen (TYLENOL) 500 MG tablet     aspirin 81 MG EC tablet     atorvastatin (LIPITOR) 40 MG tablet     doxazosin (CARDURA) 2 MG tablet     fexofenadine (ALLEGRA) 180 MG tablet     metoprolol tartrate (LOPRESSOR) 50 MG tablet     terbinafine (LAMISIL) 250 MG tablet     No current facility-administered medications for this visit.        ALL:     Allergies   Allergen Reactions     Seasonal Allergies        FMH:  No family history on file.    SocHx:    Social History     Socioeconomic History     Marital status:      Spouse name: Not on file     Number of children: Not on file     Years of education: Not on file     Highest education level: Not on file   Occupational History     Not on file   Social Needs     Financial resource strain: Not on file     Food insecurity:     Worry: Not on file     Inability: Not on file     Transportation needs:     Medical: Not on file     Non-medical: Not on file   Tobacco Use     Smoking status: Never Smoker     Smokeless tobacco: Never Used   Substance and Sexual Activity     " Alcohol use: No     Frequency: Never     Drug use: No     Sexual activity: Not on file   Lifestyle     Physical activity:     Days per week: Not on file     Minutes per session: Not on file     Stress: Not on file   Relationships     Social connections:     Talks on phone: Not on file     Gets together: Not on file     Attends Holiness service: Not on file     Active member of club or organization: Not on file     Attends meetings of clubs or organizations: Not on file     Relationship status: Not on file     Intimate partner violence:     Fear of current or ex partner: Not on file     Emotionally abused: Not on file     Physically abused: Not on file     Forced sexual activity: Not on file   Other Topics Concern     Not on file   Social History Narrative     Not on file           EXAMINATION:  Gen:   No apparent distress  Neuro:   A&Ox3, no deficits  Psych:    Answering questions appropriately for age and situation with normal affect  Head:    NCAT  Eye:    Visual scanning without deficit  Ear:    Response to auditory stimuli wnl  Lung:    Non-labored breathing on RA noted  Abd:    NTND per patient report  Lymph:    Neg for pitting/non-pitting edema BLE  Vasc:    Pulses palpable, CFT minimally delayed  Neuro:    Light touch sensation intact to all sensory nerve distributions without paresthesias  Derm:    Chronic tinea pedis in moccasin distribution with multiple nail bilateral demonstrating mycotic/dystrophic changes  MSK:    ROM, strength wnl without limitation, no pain on palpation noted.  Calf:    Neg for redness, swelling or tenderness    Assessment:  68 year old male with onychomycosis bilateral in setting of chronic tinea pedis      Plan:  Discussed etiologies, anatomy and options  1.  Onychomycosis in setting of chronic tinea pedis  -discussed difficulty in eliminating nail fungus, don in setting of chronic tinea pedis  -home fungus handout dispensed/discussed for addressing skin, shower, shoes, etc  -Rx  for Lamisil 250mg daily x 90 days  -ALT/AST today; future order signed for labs in 1,2,3 months; will call with any abnormalities.  Discussed cessation of medication is typical remedy of abnormal liver labs    Follow up:  prn or sooner with acute issues      Patient's medical history was reviewed today    There is no height or weight on file to calculate BMI.            Chuy Roberts DPM [facility] FACFAOM  Podiatric Foot & Ankle Surgeon  [facility]  [phone]

## 2019-10-25 DIAGNOSIS — B35.3 TINEA PEDIS OF BOTH FEET: ICD-10-CM

## 2019-10-25 DIAGNOSIS — B35.1 ONYCHOMYCOSIS: ICD-10-CM

## 2019-10-25 LAB
ALT SERPL W P-5'-P-CCNC: 21 U/L (ref 0–70)
AST SERPL W P-5'-P-CCNC: 15 U/L (ref 0–45)

## 2019-10-25 PROCEDURE — 84460 ALANINE AMINO (ALT) (SGPT): CPT | Performed by: PODIATRIST

## 2019-10-25 PROCEDURE — 36415 COLL VENOUS BLD VENIPUNCTURE: CPT | Performed by: PODIATRIST

## 2019-10-25 PROCEDURE — 84450 TRANSFERASE (AST) (SGOT): CPT | Performed by: PODIATRIST

## 2019-11-22 DIAGNOSIS — B35.1 ONYCHOMYCOSIS: ICD-10-CM

## 2019-11-22 DIAGNOSIS — B35.3 TINEA PEDIS OF BOTH FEET: ICD-10-CM

## 2019-11-22 LAB
ALT SERPL W P-5'-P-CCNC: 25 U/L (ref 0–70)
AST SERPL W P-5'-P-CCNC: 18 U/L (ref 0–45)

## 2019-11-22 PROCEDURE — 36415 COLL VENOUS BLD VENIPUNCTURE: CPT | Performed by: PODIATRIST

## 2019-11-22 PROCEDURE — 84460 ALANINE AMINO (ALT) (SGPT): CPT | Performed by: PODIATRIST

## 2019-11-22 PROCEDURE — 84450 TRANSFERASE (AST) (SGOT): CPT | Performed by: PODIATRIST

## 2019-12-18 ENCOUNTER — DOCUMENTATION ONLY (OUTPATIENT)
Dept: LAB | Facility: CLINIC | Age: 69
End: 2019-12-18

## 2019-12-18 DIAGNOSIS — B35.1 ONYCHOMYCOSIS: Primary | ICD-10-CM

## 2019-12-18 NOTE — PROGRESS NOTES
Patient has a lab only appointment on 12.20.2019 and no lab test/s has been ordered. Please order test/s or notified patient if lab appointment is not needed.     Thank you,  [facility] Staff

## 2019-12-19 NOTE — PROGRESS NOTES
Orders placed for ALT/AST for 12/20/19 lab draw.    Chuy Roberts DPM FACFAS FACFAOM  Podiatric Foot & Ankle Surgeon  [facility]  [phone]

## 2019-12-20 DIAGNOSIS — B35.1 ONYCHOMYCOSIS: ICD-10-CM

## 2019-12-20 LAB
ALT SERPL W P-5'-P-CCNC: 19 U/L (ref 0–70)
AST SERPL W P-5'-P-CCNC: 13 U/L (ref 0–45)

## 2019-12-20 PROCEDURE — 84450 TRANSFERASE (AST) (SGOT): CPT | Performed by: PODIATRIST

## 2019-12-20 PROCEDURE — 84460 ALANINE AMINO (ALT) (SGPT): CPT | Performed by: PODIATRIST

## 2019-12-20 PROCEDURE — 36415 COLL VENOUS BLD VENIPUNCTURE: CPT | Performed by: PODIATRIST

## 2019-12-28 DIAGNOSIS — B35.1 ONYCHOMYCOSIS: ICD-10-CM

## 2019-12-28 DIAGNOSIS — B35.3 TINEA PEDIS OF BOTH FEET: ICD-10-CM

## 2019-12-28 RX ORDER — TERBINAFINE HYDROCHLORIDE 250 MG/1
250 TABLET ORAL DAILY
Qty: 90 TABLET | Refills: 0 | Status: CANCELLED | OUTPATIENT
Start: 2019-12-28

## 2020-01-02 NOTE — TELEPHONE ENCOUNTER
I called and LVM for Bossman.  Rx declined, advised he call in to clinic to discuss.    Chuy Roberts, REESEM FACFAS FACFAOM  Podiatric Foot & Ankle Surgeon  [facility]  [phone]

## 2023-04-08 ENCOUNTER — APPOINTMENT (OUTPATIENT)
Dept: CT IMAGING | Facility: CLINIC | Age: 73
End: 2023-04-08
Attending: EMERGENCY MEDICINE
Payer: MEDICARE

## 2023-04-08 ENCOUNTER — HOSPITAL ENCOUNTER (EMERGENCY)
Facility: CLINIC | Age: 73
Discharge: HOME | End: 2023-04-08
Attending: EMERGENCY MEDICINE | Admitting: EMERGENCY MEDICINE
Payer: MEDICARE

## 2023-04-08 VITALS
TEMPERATURE: 96.9 F | SYSTOLIC BLOOD PRESSURE: 168 MMHG | RESPIRATION RATE: 18 BRPM | DIASTOLIC BLOOD PRESSURE: 74 MMHG | OXYGEN SATURATION: 99 % | HEART RATE: 80 BPM

## 2023-04-08 DIAGNOSIS — S00.01XA ABRASION OF SCALP, INITIAL ENCOUNTER: ICD-10-CM

## 2023-04-08 DIAGNOSIS — S09.90XA CLOSED HEAD INJURY, INITIAL ENCOUNTER: ICD-10-CM

## 2023-04-08 PROCEDURE — 90715 TDAP VACCINE 7 YRS/> IM: CPT | Performed by: EMERGENCY MEDICINE

## 2023-04-08 PROCEDURE — 250N000011 HC RX IP 250 OP 636: Performed by: EMERGENCY MEDICINE

## 2023-04-08 PROCEDURE — G1010 CDSM STANSON: HCPCS

## 2023-04-08 PROCEDURE — 90471 IMMUNIZATION ADMIN: CPT | Performed by: EMERGENCY MEDICINE

## 2023-04-08 PROCEDURE — 99284 EMERGENCY DEPT VISIT MOD MDM: CPT | Mod: 25

## 2023-04-08 RX ADMIN — CLOSTRIDIUM TETANI TOXOID ANTIGEN (FORMALDEHYDE INACTIVATED), CORYNEBACTERIUM DIPHTHERIAE TOXOID ANTIGEN (FORMALDEHYDE INACTIVATED), BORDETELLA PERTUSSIS TOXOID ANTIGEN (GLUTARALDEHYDE INACTIVATED), BORDETELLA PERTUSSIS FILAMENTOUS HEMAGGLUTININ ANTIGEN (FORMALDEHYDE INACTIVATED), BORDETELLA PERTUSSIS PERTACTIN ANTIGEN, AND BORDETELLA PERTUSSIS FIMBRIAE 2/3 ANTIGEN 0.5 ML: 5; 2; 2.5; 5; 3; 5 INJECTION, SUSPENSION INTRAMUSCULAR at 23:37

## 2023-04-08 ASSESSMENT — ACTIVITIES OF DAILY LIVING (ADL): ADLS_ACUITY_SCORE: 35

## 2023-04-08 ASSESSMENT — ENCOUNTER SYMPTOMS
NUMBNESS: 0
VOMITING: 0
FEVER: 0
SHORTNESS OF BREATH: 0
WEAKNESS: 0

## 2023-04-09 NOTE — ED TRIAGE NOTES
Patient reports a fall when trying to climb over his dog's fence/pen.  He reports falling backwards and hitting his head.  No LOC.  Laceration to back of head, bleeding controlled upon ED arrival.  Patient reports taking ASA (81mg)  daily.  History HTN.  ABCs intact, A&Ox4.     Triage Assessment     Row Name 04/08/23 0253       Triage Assessment (Adult)    Airway WDL WDL       Respiratory WDL    Respiratory WDL WDL       Skin Circulation/Temperature WDL    Skin Circulation/Temperature WDL X  lac to posterior head       Cardiac WDL    Cardiac WDL WDL       Peripheral/Neurovascular WDL    Peripheral Neurovascular WDL WDL       Cognitive/Neuro/Behavioral WDL    Cognitive/Neuro/Behavioral WDL WDL

## 2023-04-09 NOTE — ED PROVIDER NOTES
History     Chief Complaint:  Fall       HPI     Bossman Wayne is a 72 year old male with a history of HTN who presents with head injury.  Patient was trying to climb over a dog pen in the patient's home.  He tripped on the fencing causing him to fall backwards and strike his head.  He did not lose consciousness.  He noted bleeding to the back of the head and is concerned about a laceration.  Last tetanus immunization was in 2013.  He has mild pain over the scalp injury but denies significant headache, nausea, vomiting, numbness, weakness.  He takes aspirin but no other anticoagulants.  He denies any additional areas of trauma..    Independent Historian:    Daughter is present and reports that he has been acting appropriately.      Review of External Notes: Reviewed discharge summary from 2/4/2019 which patient had a small traumatic subdural hematoma measuring 3 mm that did not require intervention    ROS:  Review of Systems   Constitutional: Negative for fever.   Respiratory: Negative for shortness of breath.    Gastrointestinal: Negative for vomiting.   Neurological: Negative for weakness and numbness.   All other systems reviewed and are negative.      Allergies:  Seasonal Allergies     Medications:    acetaminophen (TYLENOL) 500 MG tablet  aspirin 81 MG EC tablet  atorvastatin (LIPITOR) 40 MG tablet  doxazosin (CARDURA) 2 MG tablet  fexofenadine (ALLEGRA) 180 MG tablet  metoprolol tartrate (LOPRESSOR) 50 MG tablet        Past Medical History:    Hypertension  Hyperlipidemia   Subdural hematoma    Past Surgical History:    Appendectomy  Adrenalectomy  Nephrectomy    Family History:    family history is not on file.    Social History:  Presents the ED with daughter   PCP: [facility]     Physical Exam     Patient Vitals for the past 24 hrs:   BP Temp Temp src Pulse Resp SpO2   04/08/23 2143 (!) 180/97 96.9  F (36.1  C) Temporal 72 16 100 %        Physical Exam      HEENT:   No scalp hematoma      Abrasion to the occipital scalp without efren laceration    Ashley's and Racoon's sign negative.      Midface is stable.     Oropharynx is moist, without lesions or trismus.  EYES:  Conjunctiva normal, PERRL    EOMs intact  NECK:   C-spine non-tender with full ROM.      No bony step-off to cervical spine.   CV:    Regular rate and rhythm.     No murmurs, rubs or gallops.    PULM:  Clear to auscultation bilateral.      No respiratory distress.      No subcutaneous emphysema or crepitus.  ABD:   Soft, non-tender, non-distended.      No rebound or guarding.  MSK:    No focal bony tenderness to the extremities.    LYMPH:  No cervical lymphadenopathy.  NEURO:  Alert and oriented x 3. GCS 15.      CN II-XII intact, speech is clear with no aphasia.      Strength is 5/5 in all 4 extremities.  Sensation is intact.      Normal muscular tone, no tremor.  SKIN:   Warm, dry    PSYCH:   Mood is good and affect is appropriate.      Emergency Department Course     Imaging:  Head CT w/o contrast   Final Result   IMPRESSION:   1.  No CT evidence for acute intracranial process.   2.  Brain atrophy and presumed chronic microvascular ischemic changes as above.        Report per radiology      Emergency Department Course & Assessments:      Interventions:  Medications   Tdap (tetanus-diphtheria-acell pertussis) (ADACEL) injection 0.5 mL (has no administration in time range)        Independent Interpretation (X-rays, CTs, rhythm strip):  I independently reviewed CT scan of the head in which there is no large intracranial hemorrhage    Consultations/Discussion of Management or Tests:  None    Social Determinants of Health affecting care:  None       Disposition:  The patient was discharged to home.     Impression & Plan        Medical Decision Makin-year-old male presents after mechanical fall resulting in closed head injury.  CT scan of the head is negative for skull fracture and intracerebral hemorrhage.  He has superficial  abrasions without efren laceration to the scalp.  No indication for surgical closure.  Tetanus was updated.  He is not on anticoagulation to draw concern for delayed intracerebral hemorrhage.  The remainder of his trauma evaluation is unremarkable.    Diagnosis:    ICD-10-CM    1. Closed head injury, initial encounter  S09.90XA       2. Abrasion of scalp, initial encounter  S00.01XA            Discharge Medications:  New Prescriptions    No medications on file        4/8/2023   Randolph Gutierrez MD Matthews, Jeremiah R, MD  04/08/23 0570

## 2024-02-26 ENCOUNTER — TRANSCRIBE ORDERS (OUTPATIENT)
Dept: VASCULAR SURGERY | Facility: CLINIC | Age: 74
End: 2024-02-26
Payer: COMMERCIAL

## 2024-02-26 DIAGNOSIS — R23.0 CYANOSIS OF FINGERTIP: Primary | ICD-10-CM
